# Patient Record
Sex: FEMALE | Race: WHITE | NOT HISPANIC OR LATINO | Employment: FULL TIME | URBAN - METROPOLITAN AREA
[De-identification: names, ages, dates, MRNs, and addresses within clinical notes are randomized per-mention and may not be internally consistent; named-entity substitution may affect disease eponyms.]

---

## 2024-01-26 ENCOUNTER — OFFICE VISIT (OUTPATIENT)
Dept: PRIMARY CARE | Facility: CLINIC | Age: 39
End: 2024-01-26
Payer: COMMERCIAL

## 2024-01-26 VITALS
WEIGHT: 156 LBS | DIASTOLIC BLOOD PRESSURE: 82 MMHG | SYSTOLIC BLOOD PRESSURE: 124 MMHG | HEART RATE: 138 BPM | BODY MASS INDEX: 27.63 KG/M2

## 2024-01-26 DIAGNOSIS — T63.441A ALLERGIC REACTION TO BEE STING: ICD-10-CM

## 2024-01-26 DIAGNOSIS — Z01.419 WELL FEMALE EXAM WITH ROUTINE GYNECOLOGICAL EXAM: ICD-10-CM

## 2024-01-26 DIAGNOSIS — R63.5 WEIGHT GAIN: Primary | ICD-10-CM

## 2024-01-26 DIAGNOSIS — F43.10 POSTTRAUMATIC STRESS DISORDER: ICD-10-CM

## 2024-01-26 DIAGNOSIS — F33.1 MDD (MAJOR DEPRESSIVE DISORDER), RECURRENT EPISODE, MODERATE (MULTI): Chronic | ICD-10-CM

## 2024-01-26 DIAGNOSIS — F41.1 GENERALIZED ANXIETY DISORDER: ICD-10-CM

## 2024-01-26 DIAGNOSIS — Z00.00 ROUTINE MEDICAL EXAM: ICD-10-CM

## 2024-01-26 PROBLEM — N32.81 OVERACTIVE BLADDER: Status: ACTIVE | Noted: 2022-03-25

## 2024-01-26 PROBLEM — G43.909 MIGRAINES: Status: ACTIVE | Noted: 2020-01-16

## 2024-01-26 PROBLEM — M25.552 LEFT HIP PAIN: Status: ACTIVE | Noted: 2022-07-27

## 2024-01-26 PROBLEM — R69 TAKING MEDICATION FOR CHRONIC DISEASE: Status: ACTIVE | Noted: 2020-02-07

## 2024-01-26 PROBLEM — R59.1 LYMPHADENOPATHY OF HEAD AND NECK: Status: RESOLVED | Noted: 2022-10-10 | Resolved: 2024-01-26

## 2024-01-26 PROCEDURE — 99395 PREV VISIT EST AGE 18-39: CPT | Performed by: PHYSICIAN ASSISTANT

## 2024-01-26 PROCEDURE — 1036F TOBACCO NON-USER: CPT | Performed by: PHYSICIAN ASSISTANT

## 2024-01-26 PROCEDURE — 88175 CYTOPATH C/V AUTO FLUID REDO: CPT

## 2024-01-26 RX ORDER — EPINEPHRINE 0.3 MG/.3ML
0.3 INJECTION SUBCUTANEOUS ONCE AS NEEDED
COMMUNITY
Start: 2022-05-12 | End: 2024-01-26 | Stop reason: SDUPTHER

## 2024-01-26 RX ORDER — CETIRIZINE HYDROCHLORIDE 10 MG/1
10 TABLET ORAL DAILY
COMMUNITY

## 2024-01-26 RX ORDER — HYDROXYZINE HYDROCHLORIDE 50 MG/1
50 TABLET, FILM COATED ORAL 2 TIMES DAILY PRN
COMMUNITY
Start: 2024-01-11

## 2024-01-26 RX ORDER — EPINEPHRINE 0.3 MG/.3ML
0.3 INJECTION SUBCUTANEOUS ONCE AS NEEDED
Qty: 1 EACH | Refills: 1 | Status: SHIPPED | OUTPATIENT
Start: 2024-01-26

## 2024-01-26 RX ORDER — TRIAMCINOLONE ACETONIDE 55 UG/1
2 SPRAY, METERED NASAL
COMMUNITY

## 2024-01-26 RX ORDER — SERTRALINE HYDROCHLORIDE 100 MG/1
100 TABLET, FILM COATED ORAL
COMMUNITY
Start: 2024-01-11

## 2024-01-26 ASSESSMENT — PROMIS GLOBAL HEALTH SCALE
RATE_MENTAL_HEALTH: FAIR
RATE_PHYSICAL_HEALTH: FAIR
RATE_QUALITY_OF_LIFE: GOOD
CARRYOUT_SOCIAL_ACTIVITIES: FAIR
EMOTIONAL_PROBLEMS: OFTEN
RATE_SOCIAL_SATISFACTION: GOOD
RATE_AVERAGE_FATIGUE: MODERATE
RATE_AVERAGE_PAIN: 0
CARRYOUT_PHYSICAL_ACTIVITIES: COMPLETELY
RATE_GENERAL_HEALTH: GOOD

## 2024-01-26 ASSESSMENT — ENCOUNTER SYMPTOMS
COLOR CHANGE: 0
DIARRHEA: 0
ARTHRALGIAS: 0
CHEST TIGHTNESS: 0
NAUSEA: 0
CONSTIPATION: 0
APPETITE CHANGE: 0
SLEEP DISTURBANCE: 0
FREQUENCY: 0
ACTIVITY CHANGE: 0
PALPITATIONS: 0
WHEEZING: 0
DIZZINESS: 0
LIGHT-HEADEDNESS: 0
BLOOD IN STOOL: 0
MYALGIAS: 0
TREMORS: 0
ABDOMINAL PAIN: 0
RESTLESSNESS: 1
NERVOUS/ANXIOUS: 1
SHORTNESS OF BREATH: 0

## 2024-01-26 ASSESSMENT — PAIN SCALES - GENERAL: PAINLEVEL: 0-NO PAIN
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
## 2024-02-08 LAB
CYTOLOGY CMNT CVX/VAG CYTO-IMP: NORMAL
LAB AP HPV GENOTYPE QUESTION: YES
LAB AP HPV HR: NORMAL
LABORATORY COMMENT REPORT: NORMAL
LMP START DATE: NORMAL
PATH REPORT.TOTAL CANCER: NORMAL

## 2024-03-04 ENCOUNTER — OFFICE VISIT (OUTPATIENT)
Dept: PRIMARY CARE | Facility: CLINIC | Age: 39
End: 2024-03-04
Payer: MEDICAID

## 2024-03-04 VITALS
SYSTOLIC BLOOD PRESSURE: 136 MMHG | WEIGHT: 156 LBS | HEART RATE: 118 BPM | TEMPERATURE: 98.3 F | BODY MASS INDEX: 27.63 KG/M2 | DIASTOLIC BLOOD PRESSURE: 82 MMHG | OXYGEN SATURATION: 98 %

## 2024-03-04 DIAGNOSIS — J01.10 ACUTE NON-RECURRENT FRONTAL SINUSITIS: Primary | ICD-10-CM

## 2024-03-04 PROCEDURE — 1036F TOBACCO NON-USER: CPT | Performed by: PHYSICIAN ASSISTANT

## 2024-03-04 PROCEDURE — 99213 OFFICE O/P EST LOW 20 MIN: CPT | Performed by: PHYSICIAN ASSISTANT

## 2024-03-04 RX ORDER — AMOXICILLIN AND CLAVULANATE POTASSIUM 875; 125 MG/1; MG/1
875 TABLET, FILM COATED ORAL 2 TIMES DAILY
Qty: 20 TABLET | Refills: 0 | Status: SHIPPED | OUTPATIENT
Start: 2024-03-04 | End: 2024-03-14

## 2024-03-04 ASSESSMENT — ENCOUNTER SYMPTOMS
COUGH: 1
HEADACHES: 0
SINUS PRESSURE: 0
SORE THROAT: 1

## 2024-03-04 ASSESSMENT — PAIN SCALES - GENERAL: PAINLEVEL: 8

## 2024-03-04 NOTE — PROGRESS NOTES
Subjective   Patient ID: Dominga Gilmore is a 38 y.o. female who presents for Sinusitis (1 week sinus pressure and congestion, cough.).    Sinusitis  This is a recurrent problem. The current episode started in the past 7 days. The problem has been gradually worsening since onset. There has been no fever. Associated symptoms include congestion, coughing, sneezing and a sore throat. Pertinent negatives include no headaches or sinus pressure.        Review of Systems   HENT:  Positive for congestion, sneezing and sore throat. Negative for sinus pressure.    Respiratory:  Positive for cough.    Neurological:  Negative for headaches.       Objective   /82   Pulse (!) 118   Temp 36.8 °C (98.3 °F)   Wt 70.8 kg (156 lb)   SpO2 98%   BMI 27.63 kg/m²     Physical Exam  HENT:      Right Ear: Tympanic membrane normal.      Left Ear: Tympanic membrane normal.      Nose: Congestion present.      Right Sinus: Maxillary sinus tenderness and frontal sinus tenderness present.      Left Sinus: Maxillary sinus tenderness and frontal sinus tenderness present.      Mouth/Throat:      Mouth: Mucous membranes are moist.      Pharynx: Posterior oropharyngeal erythema present.   Eyes:      Extraocular Movements: Extraocular movements intact.      Pupils: Pupils are equal, round, and reactive to light.   Cardiovascular:      Rate and Rhythm: Normal rate and regular rhythm.      Pulses: Normal pulses.   Musculoskeletal:      Cervical back: Neck supple.   Neurological:      General: No focal deficit present.      Mental Status: She is alert. Mental status is at baseline.   Psychiatric:         Thought Content: Thought content normal.         Judgment: Judgment normal.         Assessment/Plan   Diagnoses and all orders for this visit:  Acute non-recurrent frontal sinusitis  -     amoxicillin-pot clavulanate (Augmentin) 875-125 mg tablet; Take 1 tablet (875 mg) by mouth 2 times a day for 10 days.    Follow up if no better

## 2024-09-16 ENCOUNTER — OFFICE VISIT (OUTPATIENT)
Dept: PRIMARY CARE | Facility: CLINIC | Age: 39
End: 2024-09-16
Payer: COMMERCIAL

## 2024-09-16 VITALS
WEIGHT: 156 LBS | BODY MASS INDEX: 27.63 KG/M2 | DIASTOLIC BLOOD PRESSURE: 80 MMHG | HEART RATE: 84 BPM | SYSTOLIC BLOOD PRESSURE: 108 MMHG

## 2024-09-16 DIAGNOSIS — H01.00A BLEPHARITIS OF BOTH UPPER AND LOWER EYELID OF RIGHT EYE, UNSPECIFIED TYPE: ICD-10-CM

## 2024-09-16 DIAGNOSIS — H57.11 EYE PAIN, RIGHT: Primary | ICD-10-CM

## 2024-09-16 PROCEDURE — 1036F TOBACCO NON-USER: CPT | Performed by: PHYSICIAN ASSISTANT

## 2024-09-16 PROCEDURE — 99212 OFFICE O/P EST SF 10 MIN: CPT | Performed by: PHYSICIAN ASSISTANT

## 2024-09-16 ASSESSMENT — PAIN SCALES - GENERAL: PAINLEVEL: 1

## 2024-09-16 NOTE — PROGRESS NOTES
Subjective   Patient ID: Dominga Gilmore is a 38 y.o. female who presents for Eye Problem (Right eye swelling that began over the weekend. She is taking benadryl. No known injury. ).    HPI   She developed right eye swelling over the weekend.  She has been taking Benadryl which has helped.  She denies any changes in diet or anything topically that may have flared it up.  Review of Systems   Eyes:  Positive for itching. Negative for pain, discharge, redness and visual disturbance.   Respiratory:  Negative for shortness of breath.    Neurological:  Negative for headaches.       Objective   /80   Pulse 84   Wt 70.8 kg (156 lb)   BMI 27.63 kg/m²     Physical Exam  HENT:      Right Ear: Tympanic membrane normal.      Left Ear: Tympanic membrane normal.   Eyes:      General:         Right eye: No foreign body, discharge or hordeolum.      Extraocular Movements:      Right eye: Normal extraocular motion.      Left eye: Normal extraocular motion.      Conjunctiva/sclera: Conjunctivae normal.      Right eye: Right conjunctiva is not injected. No chemosis, exudate or hemorrhage.     Comments: Is a little bit of swelling under her eye but she also has a chronic twitch that does not resolve.  Her upper eyelid is also little puffy but no signs of erythema or periorbital cellulitis noted.   Neurological:      Mental Status: She is alert.         Assessment/Plan   Diagnoses and all orders for this visit:  Eye pain, right  Blepharitis of both upper and lower eyelid of right eye, unspecified type  She is no longer having pain.  Discussed continuing antihistamines and cool compresses to follow-up with her eye doctor if no better.

## 2024-09-19 ASSESSMENT — ENCOUNTER SYMPTOMS
HEADACHES: 0
EYE ITCHING: 1
EYE PAIN: 0
SHORTNESS OF BREATH: 0
EYE REDNESS: 0
EYE DISCHARGE: 0

## 2025-01-02 ENCOUNTER — TELEPHONE (OUTPATIENT)
Dept: PRIMARY CARE | Facility: CLINIC | Age: 40
End: 2025-01-02
Payer: COMMERCIAL

## 2025-01-02 ENCOUNTER — OFFICE VISIT (OUTPATIENT)
Dept: URGENT CARE | Age: 40
End: 2025-01-02
Payer: COMMERCIAL

## 2025-01-02 VITALS
HEART RATE: 88 BPM | HEIGHT: 64 IN | RESPIRATION RATE: 20 BRPM | WEIGHT: 164 LBS | BODY MASS INDEX: 28 KG/M2 | SYSTOLIC BLOOD PRESSURE: 136 MMHG | DIASTOLIC BLOOD PRESSURE: 75 MMHG | OXYGEN SATURATION: 99 % | TEMPERATURE: 98.1 F

## 2025-01-02 DIAGNOSIS — R05.1 ACUTE COUGH: Primary | ICD-10-CM

## 2025-01-02 DIAGNOSIS — U07.1 COVID: ICD-10-CM

## 2025-01-02 DIAGNOSIS — R05.9 COUGH, UNSPECIFIED TYPE: ICD-10-CM

## 2025-01-02 LAB
POC BINAX EXPIRATION: ABNORMAL
POC BINAX NOW COVID SERIAL NUMBER: ABNORMAL
POC RAPID INFLUENZA A: NEGATIVE
POC RAPID INFLUENZA B: NEGATIVE
POC SARS-COV-2 AG BINAX: ABNORMAL

## 2025-01-02 RX ORDER — ALBUTEROL SULFATE 90 UG/1
2 INHALANT RESPIRATORY (INHALATION) EVERY 6 HOURS PRN
Qty: 18 G | Refills: 0 | Status: SHIPPED | OUTPATIENT
Start: 2025-01-02 | End: 2026-01-02

## 2025-01-02 RX ORDER — BENZONATATE 100 MG/1
100 CAPSULE ORAL 3 TIMES DAILY PRN
Qty: 20 CAPSULE | Refills: 0 | Status: SHIPPED | OUTPATIENT
Start: 2025-01-02 | End: 2025-01-09

## 2025-01-02 ASSESSMENT — ENCOUNTER SYMPTOMS
FEVER: 1
COUGH: 1

## 2025-01-02 ASSESSMENT — PAIN SCALES - GENERAL: PAINLEVEL_OUTOF10: 0-NO PAIN

## 2025-01-02 NOTE — PROGRESS NOTES
"Subjective   Patient ID: Dominga Gilmore is a 39 y.o. female. They present today with a chief complaint of URI, Nasal Congestion, Cough (Dry cough; Chest congestion and pressure x 5 d.), Fever, Poor Appetite, Eye Problem (Watery eyes.), Chills (Chills and night sweats.), and Illness (Sx x 5 d.).    History of Present Illness  Here for cough  +PMH of ashtma   Has not taken any medications and reports she weaned herself off her albuterol but does report she has a heavy chest        URI  Presenting symptoms: cough and fever    Cough  Associated symptoms include a fever.   Fever   Associated symptoms include coughing.   Eye Problem  Illness  Associated symptoms: cough and fever        Past Medical History  Allergies as of 2025 - Reviewed 2025   Allergen Reaction Noted    Bee venom protein (honey bee) Anaphylaxis 2020    Egg Hives 2020       (Not in a hospital admission)       Past Medical History:   Diagnosis Date    Allergic     Anxiety     Asthma     Depression     GERD (gastroesophageal reflux disease)     Lymphadenopathy of head and neck 10/10/2022       Past Surgical History:   Procedure Laterality Date    BRAIN SURGERY       SECTION, LOW TRANSVERSE      WISDOM TOOTH EXTRACTION          reports that she has never smoked. She has never used smokeless tobacco. She reports that she does not currently use alcohol. She reports that she does not use drugs.    Review of Systems  Review of Systems   Constitutional:  Positive for fever.   Respiratory:  Positive for cough.    All other systems reviewed and are negative.                                 Objective    Vitals:    25 1027   BP: 136/75   BP Location: Left arm   Patient Position: Sitting   BP Cuff Size: Adult   Pulse: 88   Resp: 20   Temp: 36.7 °C (98.1 °F)   TempSrc: Oral   SpO2: 99%   Weight: 74.4 kg (164 lb)   Height: 1.626 m (5' 4\")     Patient's last menstrual period was 2024 (exact date).    Physical Exam  Vitals " reviewed.   Constitutional:       Appearance: Normal appearance.   HENT:      Right Ear: Hearing, tympanic membrane, ear canal and external ear normal.      Left Ear: Hearing, tympanic membrane, ear canal and external ear normal.      Nose: Nose normal.      Mouth/Throat:      Lips: Pink.      Mouth: Mucous membranes are moist.      Pharynx: Oropharynx is clear. Uvula midline.   Cardiovascular:      Rate and Rhythm: Normal rate and regular rhythm.      Pulses: Normal pulses.      Heart sounds: Normal heart sounds.   Pulmonary:      Effort: Pulmonary effort is normal.      Breath sounds: Normal breath sounds.   Neurological:      Mental Status: She is alert.         Procedures    Point of Care Test & Imaging Results from this visit  No results found for this visit on 01/02/25.   No results found.    Diagnostic study results (if any) were reviewed by MORGAN Dempsey.    Assessment/Plan   Allergies, medications, history, and pertinent labs/EKGs/Imaging reviewed by MORGAN Dempsey.     Medical Decision Making  Reviewed viral vs bacterial etiology  Rest and supportive measures  Motrin and tylenol as directed   Absence of wheezes or area of consolidation - advised rest and fluids - treat for viral etiology and consider mucinex and delsym - add albuterol     Orders and Diagnoses  Encounter Diagnoses   Name Primary?    Acute cough Yes    Cough, unspecified type     COVID              Medical Admin Record      Patient disposition: Home    Electronically signed by MORGAN Dempsey  10:36 AM

## 2025-01-02 NOTE — TELEPHONE ENCOUNTER
Patient called and stated she has a cough and chest congestion since 12-28-24. Patient was told that due to no available appointments for today, it's recommended that she go to Urgent Care.

## 2025-01-02 NOTE — LETTER
January 2, 2025     Patient: Dominga Gilmore   YOB: 1985   Date of Visit: 1/2/2025       To Whom It May Concern:    Dominga Gilmore was seen in my clinic on 1/2/2025 at 9:30 am. Please excuse Dominga for her absence from work on this day to make the appointment.  Please excuse Ms Gilmore from work on 01/02/2025 and 01/03/2025    If you have any questions or concerns, please don't hesitate to call.         Sincerely,         Meg An, APRN-CNP        CC: No Recipients

## 2025-03-31 ENCOUNTER — OFFICE VISIT (OUTPATIENT)
Dept: PRIMARY CARE | Facility: CLINIC | Age: 40
End: 2025-03-31
Payer: COMMERCIAL

## 2025-03-31 VITALS
DIASTOLIC BLOOD PRESSURE: 90 MMHG | SYSTOLIC BLOOD PRESSURE: 128 MMHG | WEIGHT: 161 LBS | HEART RATE: 87 BPM | OXYGEN SATURATION: 99 % | BODY MASS INDEX: 27.64 KG/M2

## 2025-03-31 DIAGNOSIS — R82.90 ABNORMAL FINDING ON URINALYSIS: ICD-10-CM

## 2025-03-31 DIAGNOSIS — N93.9 ABNORMAL VAGINAL BLEEDING: ICD-10-CM

## 2025-03-31 DIAGNOSIS — N92.0 MENORRHAGIA WITH REGULAR CYCLE: Primary | ICD-10-CM

## 2025-03-31 LAB
POC APPEARANCE, URINE: ABNORMAL
POC BILIRUBIN, URINE: NEGATIVE
POC BLOOD, URINE: ABNORMAL
POC COLOR, URINE: YELLOW
POC GLUCOSE, URINE: NEGATIVE MG/DL
POC KETONES, URINE: NEGATIVE MG/DL
POC LEUKOCYTES, URINE: ABNORMAL
POC NITRITE,URINE: NEGATIVE
POC PH, URINE: 7 PH
POC PROTEIN, URINE: NEGATIVE MG/DL
POC SPECIFIC GRAVITY, URINE: 1.02
POC UROBILINOGEN, URINE: 0.2 EU/DL
PREGNANCY TEST URINE, POC: NEGATIVE

## 2025-03-31 PROCEDURE — 99213 OFFICE O/P EST LOW 20 MIN: CPT | Performed by: PHYSICIAN ASSISTANT

## 2025-03-31 PROCEDURE — 81003 URINALYSIS AUTO W/O SCOPE: CPT | Performed by: PHYSICIAN ASSISTANT

## 2025-03-31 PROCEDURE — 81025 URINE PREGNANCY TEST: CPT | Performed by: PHYSICIAN ASSISTANT

## 2025-03-31 ASSESSMENT — PAIN SCALES - GENERAL: PAINLEVEL_OUTOF10: 0-NO PAIN

## 2025-03-31 NOTE — PROGRESS NOTES
Subjective   Patient ID: Dominga Gilmore is a 39 y.o. female who presents for Vaginal Bleeding (Had heavy bleeding after week of period, was going through super tampon every 2 hours for past few days. Also having suprapubic pressure and bloating. ).    Pelvic Pain  The patient's primary symptoms include pelvic pain and vaginal bleeding. The patient's pertinent negatives include no missed menses or vaginal discharge. This is a new problem. The current episode started 1 to 4 weeks ago. The problem occurs intermittently. The pain is mild. Pertinent negatives include no nausea or vomiting.        Review of Systems   Gastrointestinal:  Negative for nausea and vomiting.   Genitourinary:  Positive for menstrual problem, pelvic pain and vaginal bleeding. Negative for missed menses, vaginal discharge and vaginal pain.       Objective   /90   Pulse 87   Wt 73 kg (161 lb)   LMP 03/16/2025 (Exact Date)   SpO2 99%   BMI 27.64 kg/m²     Physical Exam  Cardiovascular:      Rate and Rhythm: Normal rate and regular rhythm.      Pulses: Normal pulses.   Pulmonary:      Effort: Pulmonary effort is normal.   Abdominal:      General: Bowel sounds are normal.      Tenderness: There is no abdominal tenderness. There is no right CVA tenderness, left CVA tenderness, guarding or rebound.   Neurological:      General: No focal deficit present.      Mental Status: She is alert and oriented to person, place, and time. Mental status is at baseline.   Psychiatric:         Mood and Affect: Mood normal.         Behavior: Behavior normal.         Thought Content: Thought content normal.         Judgment: Judgment normal.         Assessment/Plan   Diagnoses and all orders for this visit:  Menorrhagia with regular cycle  -     CBC and Auto Differential; Future  -     Iron and TIBC; Future  -     Ferritin; Future  -     Iron level; Future  -     US pelvis limited; Future  Abnormal vaginal bleeding  -     POCT UA Automated manually resulted  -      CBC and Auto Differential; Future  -     Iron and TIBC; Future  -     Ferritin; Future  -     Iron level; Future  -     US pelvis limited; Future  -     POCT Pregnancy, Urine manually resulted  Abnormal finding on urinalysis  -     Urine Culture  Will check some labs. Discussed getting an ultrasound also.  She may need bcp's or hormone treatment. She is passing clots. This has not happened before.

## 2025-04-01 ENCOUNTER — HOSPITAL ENCOUNTER (OUTPATIENT)
Dept: RADIOLOGY | Facility: HOSPITAL | Age: 40
Discharge: HOME | End: 2025-04-01
Payer: COMMERCIAL

## 2025-04-01 DIAGNOSIS — N92.0 MENORRHAGIA WITH REGULAR CYCLE: ICD-10-CM

## 2025-04-01 DIAGNOSIS — N93.9 ABNORMAL VAGINAL BLEEDING: ICD-10-CM

## 2025-04-01 LAB
BACTERIA UR CULT: NORMAL
BASOPHILS # BLD AUTO: 43 CELLS/UL (ref 0–200)
BASOPHILS NFR BLD AUTO: 0.6 %
EOSINOPHIL # BLD AUTO: 178 CELLS/UL (ref 15–500)
EOSINOPHIL NFR BLD AUTO: 2.5 %
ERYTHROCYTE [DISTWIDTH] IN BLOOD BY AUTOMATED COUNT: 12.5 % (ref 11–15)
FERRITIN SERPL-MCNC: 20 NG/ML (ref 16–154)
HCT VFR BLD AUTO: 41 % (ref 35–45)
HGB BLD-MCNC: 13.6 G/DL (ref 11.7–15.5)
IRON SATN MFR SERPL: 18 % (CALC) (ref 16–45)
IRON SERPL-MCNC: 74 MCG/DL (ref 40–190)
LYMPHOCYTES # BLD AUTO: 2833 CELLS/UL (ref 850–3900)
LYMPHOCYTES NFR BLD AUTO: 39.9 %
MCH RBC QN AUTO: 30.6 PG (ref 27–33)
MCHC RBC AUTO-ENTMCNC: 33.2 G/DL (ref 32–36)
MCV RBC AUTO: 92.1 FL (ref 80–100)
MONOCYTES # BLD AUTO: 568 CELLS/UL (ref 200–950)
MONOCYTES NFR BLD AUTO: 8 %
NEUTROPHILS # BLD AUTO: 3479 CELLS/UL (ref 1500–7800)
NEUTROPHILS NFR BLD AUTO: 49 %
PLATELET # BLD AUTO: 279 THOUSAND/UL (ref 140–400)
PMV BLD REES-ECKER: 10.5 FL (ref 7.5–12.5)
RBC # BLD AUTO: 4.45 MILLION/UL (ref 3.8–5.1)
TIBC SERPL-MCNC: 414 MCG/DL (CALC) (ref 250–450)
WBC # BLD AUTO: 7.1 THOUSAND/UL (ref 3.8–10.8)

## 2025-04-01 PROCEDURE — 76857 US EXAM PELVIC LIMITED: CPT

## 2025-04-01 PROCEDURE — 76856 US EXAM PELVIC COMPLETE: CPT

## 2025-04-02 ASSESSMENT — ENCOUNTER SYMPTOMS
NAUSEA: 0
VOMITING: 0

## 2025-08-21 ENCOUNTER — OFFICE VISIT (OUTPATIENT)
Dept: PRIMARY CARE | Facility: CLINIC | Age: 40
End: 2025-08-21
Payer: COMMERCIAL

## 2025-08-21 VITALS
OXYGEN SATURATION: 96 % | SYSTOLIC BLOOD PRESSURE: 110 MMHG | WEIGHT: 161 LBS | TEMPERATURE: 97.7 F | HEART RATE: 102 BPM | HEIGHT: 64 IN | DIASTOLIC BLOOD PRESSURE: 71 MMHG | BODY MASS INDEX: 27.49 KG/M2

## 2025-08-21 DIAGNOSIS — J02.9 ACUTE PHARYNGITIS, UNSPECIFIED ETIOLOGY: Primary | ICD-10-CM

## 2025-08-21 DIAGNOSIS — J03.90 ACUTE TONSILLITIS, UNSPECIFIED ETIOLOGY: ICD-10-CM

## 2025-08-21 LAB — POC STREP A RESULT: NEGATIVE

## 2025-08-21 RX ORDER — DEXAMETHASONE SODIUM PHOSPHATE 10 MG/ML
5 INJECTION INTRAMUSCULAR; INTRAVENOUS ONCE
Status: DISCONTINUED | OUTPATIENT
Start: 2025-08-21 | End: 2025-08-21

## 2025-08-21 ASSESSMENT — ENCOUNTER SYMPTOMS: SORE THROAT: 1

## 2025-08-22 ASSESSMENT — ENCOUNTER SYMPTOMS
HOARSE VOICE: 0
STRIDOR: 0
TROUBLE SWALLOWING: 1
SWOLLEN GLANDS: 1

## 2025-08-24 LAB
BASOPHILS # BLD AUTO: 53 CELLS/UL (ref 0–200)
BASOPHILS NFR BLD AUTO: 0.8 %
EOSINOPHIL # BLD AUTO: 139 CELLS/UL (ref 15–500)
EOSINOPHIL NFR BLD AUTO: 2.1 %
ERYTHROCYTE [DISTWIDTH] IN BLOOD BY AUTOMATED COUNT: 12.1 % (ref 11–15)
HCT VFR BLD AUTO: 42.8 % (ref 35–45)
HETEROPH AB SER QL LA: NORMAL
HGB BLD-MCNC: 14 G/DL (ref 11.7–15.5)
LYMPHOCYTES # BLD AUTO: 3604 CELLS/UL (ref 850–3900)
LYMPHOCYTES NFR BLD AUTO: 54.6 %
MCH RBC QN AUTO: 30.7 PG (ref 27–33)
MCHC RBC AUTO-ENTMCNC: 32.7 G/DL (ref 32–36)
MCV RBC AUTO: 93.9 FL (ref 80–100)
MONOCYTES # BLD AUTO: 389 CELLS/UL (ref 200–950)
MONOCYTES NFR BLD AUTO: 5.9 %
NEUTROPHILS # BLD AUTO: 2416 CELLS/UL (ref 1500–7800)
NEUTROPHILS NFR BLD AUTO: 36.6 %
PLATELET # BLD AUTO: 281 THOUSAND/UL (ref 140–400)
PMV BLD REES-ECKER: 10.5 FL (ref 7.5–12.5)
RBC # BLD AUTO: 4.56 MILLION/UL (ref 3.8–5.1)
WBC # BLD AUTO: 6.6 THOUSAND/UL (ref 3.8–10.8)

## 2025-08-25 ENCOUNTER — APPOINTMENT (OUTPATIENT)
Dept: PRIMARY CARE | Facility: CLINIC | Age: 40
End: 2025-08-25
Payer: COMMERCIAL

## 2025-08-25 VITALS
TEMPERATURE: 98.2 F | HEIGHT: 64 IN | HEART RATE: 84 BPM | DIASTOLIC BLOOD PRESSURE: 86 MMHG | SYSTOLIC BLOOD PRESSURE: 134 MMHG | BODY MASS INDEX: 27.49 KG/M2 | WEIGHT: 161 LBS | OXYGEN SATURATION: 96 %

## 2025-08-25 DIAGNOSIS — B27.80 OTHER INFECTIOUS MONONUCLEOSIS WITHOUT COMPLICATION: Primary | ICD-10-CM

## 2025-08-25 LAB
BASOPHILS # BLD AUTO: 53 CELLS/UL (ref 0–200)
BASOPHILS NFR BLD AUTO: 0.8 %
EOSINOPHIL # BLD AUTO: 139 CELLS/UL (ref 15–500)
EOSINOPHIL NFR BLD AUTO: 2.1 %
ERYTHROCYTE [DISTWIDTH] IN BLOOD BY AUTOMATED COUNT: 12.1 % (ref 11–15)
HCT VFR BLD AUTO: 42.8 % (ref 35–45)
HETEROPH AB SER QL LA: POSITIVE
HGB BLD-MCNC: 14 G/DL (ref 11.7–15.5)
LYMPHOCYTES # BLD AUTO: 3604 CELLS/UL (ref 850–3900)
LYMPHOCYTES NFR BLD AUTO: 54.6 %
MCH RBC QN AUTO: 30.7 PG (ref 27–33)
MCHC RBC AUTO-ENTMCNC: 32.7 G/DL (ref 32–36)
MCV RBC AUTO: 93.9 FL (ref 80–100)
MONOCYTES # BLD AUTO: 389 CELLS/UL (ref 200–950)
MONOCYTES NFR BLD AUTO: 5.9 %
NEUTROPHILS # BLD AUTO: 2416 CELLS/UL (ref 1500–7800)
NEUTROPHILS NFR BLD AUTO: 36.6 %
PLATELET # BLD AUTO: 281 THOUSAND/UL (ref 140–400)
PMV BLD REES-ECKER: 10.5 FL (ref 7.5–12.5)
RBC # BLD AUTO: 4.56 MILLION/UL (ref 3.8–5.1)
WBC # BLD AUTO: 6.6 THOUSAND/UL (ref 3.8–10.8)

## 2025-08-25 PROCEDURE — 99212 OFFICE O/P EST SF 10 MIN: CPT | Performed by: PHYSICIAN ASSISTANT

## 2025-08-25 PROCEDURE — 3008F BODY MASS INDEX DOCD: CPT | Performed by: PHYSICIAN ASSISTANT

## 2025-08-27 PROBLEM — F33.1 MODERATE EPISODE OF RECURRENT MAJOR DEPRESSIVE DISORDER: Status: ACTIVE | Noted: 2023-12-12

## 2025-08-27 ASSESSMENT — ENCOUNTER SYMPTOMS
ABDOMINAL PAIN: 0
APPETITE CHANGE: 0
SORE THROAT: 1
ACTIVITY CHANGE: 0
FEVER: 0